# Patient Record
Sex: FEMALE | Race: WHITE | NOT HISPANIC OR LATINO | Employment: FULL TIME | URBAN - METROPOLITAN AREA
[De-identification: names, ages, dates, MRNs, and addresses within clinical notes are randomized per-mention and may not be internally consistent; named-entity substitution may affect disease eponyms.]

---

## 2020-01-16 PROBLEM — O09.899 SINGLE UMBILICAL ARTERY AFFECTING MANAGEMENT OF MOTHER IN SINGLETON PREGNANCY, ANTEPARTUM: Status: ACTIVE | Noted: 2020-01-16

## 2020-02-13 DIAGNOSIS — O09.899 SINGLE UMBILICAL ARTERY AFFECTING MANAGEMENT OF MOTHER IN SINGLETON PREGNANCY, ANTEPARTUM: Primary | ICD-10-CM

## 2020-02-14 ENCOUNTER — TELEPHONE (OUTPATIENT)
Dept: PEDIATRIC CARDIOLOGY | Facility: CLINIC | Age: 20
End: 2020-02-14

## 2020-02-14 ENCOUNTER — OFFICE VISIT (OUTPATIENT)
Dept: PEDIATRIC CARDIOLOGY | Facility: CLINIC | Age: 20
End: 2020-02-14
Attending: PEDIATRICS
Payer: COMMERCIAL

## 2020-02-14 ENCOUNTER — CLINICAL SUPPORT (OUTPATIENT)
Dept: PEDIATRIC CARDIOLOGY | Facility: CLINIC | Age: 20
End: 2020-02-14
Attending: OBSTETRICS & GYNECOLOGY
Payer: COMMERCIAL

## 2020-02-14 VITALS
SYSTOLIC BLOOD PRESSURE: 116 MMHG | DIASTOLIC BLOOD PRESSURE: 66 MMHG | BODY MASS INDEX: 22.8 KG/M2 | HEIGHT: 62 IN | WEIGHT: 123.88 LBS | HEART RATE: 71 BPM | OXYGEN SATURATION: 99 %

## 2020-02-14 DIAGNOSIS — O09.899 SINGLE UMBILICAL ARTERY AFFECTING MANAGEMENT OF MOTHER IN SINGLETON PREGNANCY, ANTEPARTUM: ICD-10-CM

## 2020-02-14 DIAGNOSIS — Q27.0 SINGLE UMBILICAL ARTERY: Primary | ICD-10-CM

## 2020-02-14 PROCEDURE — 76825 ECHO EXAM OF FETAL HEART: CPT | Mod: S$GLB,,, | Performed by: PEDIATRICS

## 2020-02-14 PROCEDURE — 99999 PR PBB SHADOW E&M-EST. PATIENT-LVL III: ICD-10-PCS | Mod: PBBFAC,,, | Performed by: PEDIATRICS

## 2020-02-14 PROCEDURE — 99242 OFF/OP CONSLTJ NEW/EST SF 20: CPT | Mod: 25,S$GLB,, | Performed by: PEDIATRICS

## 2020-02-14 PROCEDURE — 93325 DOPPLER ECHO COLOR FLOW MAPG: CPT | Mod: S$GLB,,, | Performed by: PEDIATRICS

## 2020-02-14 PROCEDURE — 76827 PR  SO2 FETAL HEART DOPPLER: ICD-10-PCS | Mod: S$GLB,,, | Performed by: PEDIATRICS

## 2020-02-14 PROCEDURE — 99999 PR PBB SHADOW E&M-EST. PATIENT-LVL III: CPT | Mod: PBBFAC,,, | Performed by: PEDIATRICS

## 2020-02-14 PROCEDURE — 93325 PR DOPPLER COLOR FLOW VELOCITY MAP: ICD-10-PCS | Mod: S$GLB,,, | Performed by: PEDIATRICS

## 2020-02-14 PROCEDURE — 99242 PR OFFICE CONSULTATION,LEVEL II: ICD-10-PCS | Mod: 25,S$GLB,, | Performed by: PEDIATRICS

## 2020-02-14 PROCEDURE — 76827 ECHO EXAM OF FETAL HEART: CPT | Mod: S$GLB,,, | Performed by: PEDIATRICS

## 2020-02-14 PROCEDURE — 76825 PR  SO2 FETAL HEART: ICD-10-PCS | Mod: S$GLB,,, | Performed by: PEDIATRICS

## 2020-02-14 RX ORDER — OMEGA-3 FATTY ACIDS, ICOSAPENT, DOCONEXENT, THIAMINE MONONITRATE, RIBOFLAVIN, PYRIDOXINE HYDROCHLORIDE, CYANOCOBALAMIN, FOLIC ACID, CHOLECALCIFEROL, ASCORBIC ACID, .ALPHA.-TOCOPHEROL, D-, CUPRIC SULFATE, ZINC OXIDE, FERROUS FUMARATE AND MAGNESIUM OXIDE 200; 3; 3; 20; 15; 1; 400; 100; 30; 1; 20; 28; 30 MG/1; MG/1; MG/1; MG/1; UG/1; MG/1; [IU]/1; MG/1; [IU]/1; MG/1; MG/1; MG/1; MG/1
1 CAPSULE, GELATIN COATED ORAL DAILY
Status: ON HOLD | COMMUNITY
Start: 2020-02-12 | End: 2020-04-20 | Stop reason: HOSPADM

## 2020-02-14 NOTE — LETTER
February 14, 2020        Abdi Torres MD  [address] [place] [address]  [address]  [facility] [address]             [facility] Pediatric Cardiology [facility]  [address], [address] FLO[address]  [facility] [address]  Phone: [phone]  Fax: [phone]   Patient: Lillie Salazar   MR Number: [id number]   YOB: 2000   Date of Visit: 2/14/2020       Dear Dr. Torres:    Thank you for referring Lillie Salazar to me for evaluation. Attached you will find relevant portions of my assessment and plan of care.    If you have questions, please do not hesitate to call me. I look forward to following Lillie Salazar along with you.    Sincerely,      Dona Mahmood MD            CC  Santiago Sin MD    [facility]

## 2020-02-14 NOTE — PROGRESS NOTES
[facility] Pediatric Cardiology [place] [address] Fetal Cardiology Clinic    Today, I had the pleasure of evaluating Lillie Salazar who is now 20 y.o. and carrying her first pregnancy at 30 2/7 weeks gestation with an JACQUE of 20. She was referred for evaluation of the fetal heart due single umbilical artery.      She is carrying a male fetus, yet unnamed.  Pregnancy thus far has been otherwise uncomplicated.     Obstetric History:    .  Her OB history is otherwise unremarkable.     No past medical history on file.      Current Outpatient Medications:     C-CHRIS DHA 28 mg iron-1 mg -200 mg Cap, Take 1 tablet by mouth once daily., Disp: , Rfl:     prenatal vit/iron fum/folic ac (PRENATAL 1+1 ORAL), Take by mouth., Disp: , Rfl:      Review of patient's allergies indicates:  No Known Allergies    Family History: Negative for congenital heart disease, genetic syndromes, multiple miscarriages or other congenital anomalies.    Social History: Ms. Lillie Saalzar is  to the father of the baby.  The father of the baby is involved.     FETAL ECHOCARDIOGRAM (summary):  Fetal echo at 30 2/7wga, JACQUE 20.  Normal fetal echocardiogram.  (Full report in electronic medical record)    Impression:  Single active male fetus at 30 2/7 wga. Single umbilical artery. Normal fetal echocardiogram.      Todays fetal echocardiogram is normal, within the limitations of fetal echocardiography.  I discussed with her that fetal echocardiography is insufficiently sensitive to rule out all septal defects, anomalies of pulmonary and systemic veins, arch anomalies, and some valvar abnormalities, nor can it ensure that the ductus arteriosus and foramen ovale will spontaneously close.     Recommendations:  Location, timing, and mode of delivery will be determined by the obstetrical team.  She does not require further follow-up in the fetal echocardiography clinic, but I would be happy to see her again if additional questions or concerns  arise.    Should there be any concerns about the baby's heart after birth, a post- echocardiogram and cardiology consultation are recommended.         Dona Mahmood MD, MSCI  Pediatric Cardiology  Pediatric Echocardiography, Fetal Echocardiography, Cardiac MRI  [facility] [address] [place]  [address]  Phone [phone], Fax [phone]        The above information was discussed in detail including the use of diagrams, with 30 minutes of total face to face time, with greater than 50% with counseling and coordination of care.  The discussion of the diagnosis and treatment options is as described above.

## 2020-04-19 PROBLEM — N89.8 VAGINAL DISCHARGE DURING PREGNANCY: Status: ACTIVE | Noted: 2020-04-19

## 2020-04-19 PROBLEM — O26.899 VAGINAL DISCHARGE DURING PREGNANCY: Status: ACTIVE | Noted: 2020-04-19

## 2020-04-20 DIAGNOSIS — U07.1 COVID-19 VIRUS DETECTED: ICD-10-CM

## 2023-12-01 PROBLEM — O47.9 UTERINE CONTRACTIONS AT GREATER THAN 20 WEEKS OF GESTATION: Status: ACTIVE | Noted: 2023-12-01

## 2023-12-01 PROBLEM — Z3A.39 39 WEEKS GESTATION OF PREGNANCY: Status: ACTIVE | Noted: 2023-12-01
